# Patient Record
Sex: MALE | Race: BLACK OR AFRICAN AMERICAN | Employment: FULL TIME | URBAN - METROPOLITAN AREA
[De-identification: names, ages, dates, MRNs, and addresses within clinical notes are randomized per-mention and may not be internally consistent; named-entity substitution may affect disease eponyms.]

---

## 2020-11-09 ENCOUNTER — HOSPITAL ENCOUNTER (EMERGENCY)
Age: 25
Discharge: HOME | End: 2020-11-09
Payer: COMMERCIAL

## 2020-11-09 VITALS
TEMPERATURE: 98.4 F | SYSTOLIC BLOOD PRESSURE: 131 MMHG | OXYGEN SATURATION: 99 % | HEART RATE: 81 BPM | HEIGHT: 72 IN | RESPIRATION RATE: 16 BRPM | DIASTOLIC BLOOD PRESSURE: 69 MMHG | BODY MASS INDEX: 27.09 KG/M2 | WEIGHT: 200 LBS

## 2020-11-09 DIAGNOSIS — Z86.19 HISTORY OF CHLAMYDIA INFECTION: ICD-10-CM

## 2020-11-09 DIAGNOSIS — R36.9 PENILE DISCHARGE: Primary | ICD-10-CM

## 2020-11-09 LAB
APPEARANCE UR: ABNORMAL
BACTERIA URNS QL MICRO: 0 /HPF
BILIRUB UR QL: NEGATIVE
COLOR UR: YELLOW
CRYSTALS URNS QL MICRO: ABNORMAL /LPF
EPI CELLS #/AREA URNS HPF: ABNORMAL /HPF
GLUCOSE UR STRIP.AUTO-MCNC: NEGATIVE MG/DL
HGB UR QL STRIP: NEGATIVE
KETONES UR QL STRIP.AUTO: NEGATIVE MG/DL
LEUKOCYTE ESTERASE UR QL STRIP.AUTO: ABNORMAL
NITRITE UR QL STRIP.AUTO: NEGATIVE
PH UR STRIP: 6.5 [PH] (ref 5–9)
PROT UR STRIP-MCNC: NEGATIVE MG/DL
RBC #/AREA URNS HPF: ABNORMAL /HPF
SP GR UR REFRACTOMETRY: 1.03 (ref 1–1.02)
UROBILINOGEN UR QL STRIP.AUTO: 1 EU/DL (ref 0.2–1)
WBC URNS QL MICRO: ABNORMAL /HPF

## 2020-11-09 PROCEDURE — 87491 CHLMYD TRACH DNA AMP PROBE: CPT

## 2020-11-09 PROCEDURE — 74011250637 HC RX REV CODE- 250/637

## 2020-11-09 PROCEDURE — 96372 THER/PROPH/DIAG INJ SC/IM: CPT

## 2020-11-09 PROCEDURE — 74011250636 HC RX REV CODE- 250/636

## 2020-11-09 PROCEDURE — 74011000250 HC RX REV CODE- 250

## 2020-11-09 PROCEDURE — 81001 URINALYSIS AUTO W/SCOPE: CPT

## 2020-11-09 PROCEDURE — 99283 EMERGENCY DEPT VISIT LOW MDM: CPT

## 2020-11-09 RX ORDER — AZITHROMYCIN 250 MG/1
1000 TABLET, FILM COATED ORAL
Status: COMPLETED | OUTPATIENT
Start: 2020-11-09 | End: 2020-11-09

## 2020-11-09 RX ADMIN — AZITHROMYCIN MONOHYDRATE 1000 MG: 250 TABLET ORAL at 06:14

## 2020-11-09 RX ADMIN — LIDOCAINE HYDROCHLORIDE 250 MG: 10 INJECTION, SOLUTION INFILTRATION; PERINEURAL at 06:15

## 2020-11-09 NOTE — DISCHARGE INSTRUCTIONS
Patient Education        Safer Sex: Care Instructions  Your Care Instructions  Safer sex is a way to reduce your risk of getting an infection spread through sex. It can also help prevent pregnancy. Most infections that are spread through sex, also called sexually transmitted infections or STIs, can be cured. But some can decrease your chances of getting pregnant if they are not treated early. Others, such as herpes, have no cure. And some, such as HIV, can be deadly. Several products can help you practice safer sex and reduce your chance of STIs. One of the best is a condom. There are condoms for men and for women. The female condom is a tube of soft plastic with a closed end that is placed deep into the vagina. You can use a special rubber sheet (dental dam) for protection during oral sex. Disposable gloves can keep your hands from touching blood, semen, or other body fluids that can carry infections. Remember that birth control methods such as diaphragms, IUDs, foams, and birth control pills do not stop you from getting STIs. Follow-up care is a key part of your treatment and safety. Be sure to make and go to all appointments, and call your doctor if you are having problems. It's also a good idea to know your test results and keep a list of the medicines you take. How can you care for yourself at home? · Think about getting shots to prevent hepatitis A and hepatitis B. These two diseases can be spread through sex. You also can get hepatitis A if you eat infected food. · Use condoms or female condoms each time and every time you have sex. · Learn the right way to use a male condom:  ? Condoms come in several sizes. Make sure you use the right size. A condom that is too small can break easily. A condom that is too big can slip off during sex. Use a new condom each time you have sex. ? Be careful not to poke a hole in the condom when you open the wrapper. ?  Squeeze the tip of the condom to keep out air.  ? Pull down the loose skin (foreskin) from the head of an uncircumcised penis. ? While squeezing the tip of the condom, unroll it all the way down to the base of the firm penis. ? Never use petroleum jelly (such as Vaseline), grease, hand lotion, baby oil, or anything with oil in it. These products can make holes in the condom. ? After sex, hold the condom on your penis as you remove your penis from your partner. This will keep semen from spilling out of the condom. · Learn to use a female condom:  ? You can put in a female condom up to 8 hours before sex. ? Squeeze the smaller ring at the closed end and insert it deep into the vagina. The larger ring at the open end should stay outside the vagina. ? During sex, make sure the penis goes into the condom. ? After the penis is removed, close the open end of the condom by twisting it. Remove the condom. · Do not use a female condom and male condom at the same time. · Do not have sex with anyone who has symptoms of an STI, such as sores on the genitals or mouth. The herpes virus that causes cold sores can spread to and from the penis and vagina. · Do not drink a lot of alcohol or use drugs before sex. This can cause you to let down your guard and not practice safer sex. · Having one sex partner (who does not have STIs and does not have sex with anyone else) is a sure way to avoid STIs. · Talk to your partner before you have sex. Find out if he or she has or is at risk for any STI. Keep in mind that a person may be able to spread an STI even if he or she does not have symptoms. You and your partner may want to get an HIV test. You should get tested again 6 months later. Where can you learn more? Go to http://www.gray.com/  Enter B608 in the search box to learn more about \"Safer Sex: Care Instructions. \"  Current as of: February 26, 2020               Content Version: 12.6  © 0593-0117 [facility], [facility].    Care instructions adapted under license by [employer] (which disclaims liability or warranty for this information). If you have questions about a medical condition or this instruction, always ask your healthcare professional. [address] any warranty or liability for your use of this information.

## 2020-11-09 NOTE — ED NOTES
I have reviewed discharge instructions with the patient. The patient verbalized understanding. Patient left ED via Discharge Method: ambulatory to Home with self. Opportunity for questions and clarification provided. Patient given 0 scripts. To continue your aftercare when you leave the hospital, you may receive an automated call from our care team to check in on how you are doing. This is a free service and part of our promise to provide the best care and service to meet your aftercare needs.  If you have questions, or wish to unsubscribe from this service please call [phone]. Thank you for Choosing our [facility] Emergency Department.

## 2020-11-09 NOTE — ED PROVIDER NOTES
61-year-old male having penile discharge. He stated new sexual partner. Patient has a history of chlamydia twice in the past.  Patient is use the emergency department for health department. The history is provided by the patient. Penile Discharge   This is a recurrent problem. The current episode started more than 2 days ago. The problem occurs constantly. Primary symptoms include dysuria and penile discharge. Pertinent negatives include no anorexia and no abdominal pain. History reviewed. No pertinent past medical history. History reviewed. No pertinent surgical history. History reviewed. No pertinent family history.     Social History     Socioeconomic History    Marital status: SINGLE     Spouse name: Not on file    Number of children: Not on file    Years of education: Not on file    Highest education level: Not on file   Occupational History    Not on file   Social Needs    Financial resource strain: Not on file    Food insecurity     Worry: Not on file     Inability: Not on file    Transportation needs     Medical: Not on file     Non-medical: Not on file   Tobacco Use    Smoking status: Never Smoker   Substance and Sexual Activity    Alcohol use: No    Drug use: No    Sexual activity: Yes     Partners: Female   Lifestyle    Physical activity     Days per week: Not on file     Minutes per session: Not on file    Stress: Not on file   Relationships    Social connections     Talks on phone: Not on file     Gets together: Not on file     Attends Mormonism service: Not on file     Active member of club or organization: Not on file     Attends meetings of clubs or organizations: Not on file     Relationship status: Not on file    Intimate partner violence     Fear of current or ex partner: Not on file     Emotionally abused: Not on file     Physically abused: Not on file     Forced sexual activity: Not on file   Other Topics Concern    Not on file   Social History Narrative  Not on file         ALLERGIES: Patient has no known allergies. Review of Systems   Constitutional: Negative. Negative for activity change. HENT: Negative. Eyes: Negative. Respiratory: Negative. Cardiovascular: Negative. Gastrointestinal: Negative. Negative for abdominal pain and anorexia. Genitourinary: Positive for dysuria and penile discharge. Musculoskeletal: Negative. Skin: Negative. Neurological: Negative. Psychiatric/Behavioral: Negative. All other systems reviewed and are negative. Vitals:    11/09/20 0534   BP: (!) 147/72   Pulse: 83   Resp: 16   Temp: 98.4 °F (36.9 °C)   SpO2: 99%   Weight: 90.7 kg (200 lb)   Height: 6' (1.829 m)            Physical Exam  Vitals signs and nursing note reviewed. Constitutional:       General: He is not in acute distress. Appearance: He is well-developed. HENT:      Head: Normocephalic and atraumatic. Right Ear: External ear normal.      Left Ear: External ear normal.      Nose: Nose normal.   Eyes:      General: No scleral icterus. Right eye: No discharge. Left eye: No discharge. Conjunctiva/sclera: Conjunctivae normal.      Pupils: Pupils are equal, round, and reactive to light. Neck:      Musculoskeletal: Normal range of motion. Cardiovascular:      Rate and Rhythm: Regular rhythm. Pulmonary:      Effort: Pulmonary effort is normal. No respiratory distress. Breath sounds: Normal breath sounds. No stridor. No wheezing or rales. Abdominal:      General: Bowel sounds are normal. There is no distension. Palpations: Abdomen is soft. Tenderness: There is no abdominal tenderness. Musculoskeletal: Normal range of motion. Skin:     General: Skin is warm and dry. Findings: No rash. Neurological:      Mental Status: He is alert and oriented to person, place, and time. Motor: No abnormal muscle tone.       Coordination: Coordination normal.   Psychiatric: Behavior: Behavior normal.          MDM  Number of Diagnoses or Management Options  Diagnosis management comments: Prophylactic coverage for STDs. Patient is advised to follow-up with the health department in the future was also given safe sex instructions compliance is unlikely.     Risk of Complications, Morbidity, and/or Mortality  Presenting problems: low  Diagnostic procedures: low  Management options: low           Procedures

## 2020-11-11 LAB
C TRACH RRNA SPEC QL NAA+PROBE: NEGATIVE
N GONORRHOEA RRNA SPEC QL NAA+PROBE: POSITIVE
SPECIMEN SOURCE: ABNORMAL

## 2021-02-12 ENCOUNTER — HOSPITAL ENCOUNTER (EMERGENCY)
Age: 26
Discharge: HOME | End: 2021-02-12
Attending: STUDENT IN AN ORGANIZED HEALTH CARE EDUCATION/TRAINING PROGRAM
Payer: COMMERCIAL

## 2021-02-12 VITALS
DIASTOLIC BLOOD PRESSURE: 85 MMHG | BODY MASS INDEX: 25.73 KG/M2 | HEIGHT: 72 IN | TEMPERATURE: 97.6 F | HEART RATE: 84 BPM | OXYGEN SATURATION: 100 % | SYSTOLIC BLOOD PRESSURE: 142 MMHG | RESPIRATION RATE: 18 BRPM | WEIGHT: 190 LBS

## 2021-02-12 DIAGNOSIS — N34.2 URETHRITIS: ICD-10-CM

## 2021-02-12 DIAGNOSIS — M54.50 ACUTE MIDLINE LOW BACK PAIN WITHOUT SCIATICA: Primary | ICD-10-CM

## 2021-02-12 LAB
APPEARANCE UR: CLEAR
BILIRUB UR QL: NEGATIVE
COLOR UR: YELLOW
GLUCOSE UR STRIP.AUTO-MCNC: NEGATIVE MG/DL
HGB UR QL STRIP: NEGATIVE
KETONES UR QL STRIP.AUTO: NEGATIVE MG/DL
LEUKOCYTE ESTERASE UR QL STRIP.AUTO: NEGATIVE
NITRITE UR QL STRIP.AUTO: NEGATIVE
PH UR STRIP: 7 [PH] (ref 5–9)
PROT UR STRIP-MCNC: NEGATIVE MG/DL
SP GR UR REFRACTOMETRY: 1.01 (ref 1–1.02)
UROBILINOGEN UR QL STRIP.AUTO: 0.2 EU/DL (ref 0.2–1)

## 2021-02-12 PROCEDURE — 96372 THER/PROPH/DIAG INJ SC/IM: CPT

## 2021-02-12 PROCEDURE — 81003 URINALYSIS AUTO W/O SCOPE: CPT

## 2021-02-12 PROCEDURE — 74011000250 HC RX REV CODE- 250: Performed by: STUDENT IN AN ORGANIZED HEALTH CARE EDUCATION/TRAINING PROGRAM

## 2021-02-12 PROCEDURE — 74011250637 HC RX REV CODE- 250/637: Performed by: STUDENT IN AN ORGANIZED HEALTH CARE EDUCATION/TRAINING PROGRAM

## 2021-02-12 PROCEDURE — 99284 EMERGENCY DEPT VISIT MOD MDM: CPT

## 2021-02-12 PROCEDURE — 74011250636 HC RX REV CODE- 250/636: Performed by: STUDENT IN AN ORGANIZED HEALTH CARE EDUCATION/TRAINING PROGRAM

## 2021-02-12 PROCEDURE — 87491 CHLMYD TRACH DNA AMP PROBE: CPT

## 2021-02-12 RX ORDER — DOXYCYCLINE HYCLATE 100 MG
100 TABLET ORAL 2 TIMES DAILY
Qty: 20 TAB | Refills: 0 | Status: SHIPPED | OUTPATIENT
Start: 2021-02-12 | End: 2021-02-22

## 2021-02-12 RX ORDER — CYCLOBENZAPRINE HCL 5 MG
10 TABLET ORAL
Qty: 20 TAB | Refills: 0 | Status: SHIPPED | OUTPATIENT
Start: 2021-02-12 | End: 2022-01-23

## 2021-02-12 RX ORDER — IBUPROFEN 800 MG/1
800 TABLET ORAL
Status: COMPLETED | OUTPATIENT
Start: 2021-02-12 | End: 2021-02-12

## 2021-02-12 RX ORDER — IBUPROFEN 800 MG/1
800 TABLET ORAL
Qty: 20 TAB | Refills: 0 | Status: SHIPPED | OUTPATIENT
Start: 2021-02-12 | End: 2021-02-19

## 2021-02-12 RX ADMIN — LIDOCAINE HYDROCHLORIDE 500 MG: 10 INJECTION, SOLUTION INFILTRATION; PERINEURAL at 09:19

## 2021-02-12 RX ADMIN — IBUPROFEN 800 MG: 800 TABLET, FILM COATED ORAL at 09:19

## 2021-02-12 NOTE — ED NOTES
I have reviewed discharge instructions with the patient. The patient verbalized understanding. Patient left ED via Discharge Method: ambulatory to Home with self    Opportunity for questions and clarification provided. Patient given 3 scripts. To continue your aftercare when you leave the hospital, you may receive an automated call from our care team to check in on how you are doing. This is a free service and part of our promise to provide the best care and service to meet your aftercare needs.  If you have questions, or wish to unsubscribe from this service please call [phone]. Thank you for Choosing our [address] [place] [address] Emergency Department.

## 2021-02-12 NOTE — ED TRIAGE NOTES
Patient ambulated into triage without difficulty.  Reports lower back pain x2 weeks.  Reports whites drainage in urine.  Reports drinking and party a lot with his friends.

## 2021-02-12 NOTE — DISCHARGE INSTRUCTIONS
The medication for your back pain as prescribed. Avoid all sexual contact until your culture results have been reviewed, your antibiotic is completed and your symptoms have resolved. Return to this department for worsening symptoms, concerns or questions. You should be contacted with positive results however it is imperative that you follow-up to ensure these notes have been reviewed and are negative. Any sexual contacts should be notified of your symptoms and treated as well.

## 2021-02-13 NOTE — ED PROVIDER NOTES
82-year-old male patient presents to the emergency department with reports of low back pain and no penile discharge. Patient states symptoms started yesterday. He reports intermittent white appearing penile discharge before and after urinating. He denies any overt dysuria or hematuria. He denies any skin rashes or testicular discomfort. Patient reports history of STI in the past was fully treated. He is unaware of any sexual partners complaining of symptoms. He denies fever or chills. 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 He reports aching discomfort in his low back is worsened with movement and certain positions. This is been ongoing for several days. He describes it as a muscle type cramp. This pain does not radiate. Again he denies fever, chills, changes in bowel or bladder habits. No past medical history on file. No past surgical history on file. No family history on file.     Social History     Socioeconomic History    Marital status: SINGLE     Spouse name: Not on file    Number of children: Not on file    Years of education: Not on file    Highest education level: Not on file   Occupational History    Not on file   Social Needs    Financial resource strain: Not on file    Food insecurity     Worry: Not on file     Inability: Not on file    Transportation needs     Medical: Not on file     Non-medical: Not on file   Tobacco Use    Smoking status: Never Smoker   Substance and Sexual Activity    Alcohol use: No    Drug use: No    Sexual activity: Yes     Partners: Female   Lifestyle    Physical activity     Days per week: Not on file     Minutes per session: Not on file    Stress: Not on file   Relationships    Social connections     Talks on phone: Not on file     Gets together: Not on file     Attends Baptism service: Not on file     Active member of club or organization: Not on file     Attends meetings of clubs or organizations: Not on file     Relationship status: Not on file    Intimate partner violence     Fear of current or ex partner: Not on file     Emotionally abused: Not on file     Physically abused: Not on file     Forced sexual activity: Not on file   Other Topics Concern    Not on file   Social History Narrative    Not on file         ALLERGIES: Patient has no known allergies. Review of Systems   Constitutional: Negative for chills, diaphoresis and fever. HENT: Negative for congestion, sneezing and sore throat. Eyes: Negative for visual disturbance. Respiratory: Negative for cough, chest tightness, shortness of breath and wheezing. Cardiovascular: Negative for chest pain and leg swelling. Gastrointestinal: Negative for abdominal pain, blood in stool, diarrhea, nausea and vomiting. Endocrine: Negative for polyuria. Genitourinary: Positive for discharge. Negative for difficulty urinating, dysuria, flank pain, hematuria and urgency. Musculoskeletal: Positive for back pain. Negative for myalgias, neck pain and neck stiffness. Skin: Negative for color change and rash. Neurological: Negative for dizziness, syncope, speech difficulty, weakness, light-headedness, numbness and headaches. Psychiatric/Behavioral: Negative for behavioral problems. All other systems reviewed and are negative. Vitals:    02/12/21 0714 02/12/21 1102   BP: (!) 145/66 (!) 142/85   Pulse: 93 84   Resp: 18    Temp: 97.6 °F (36.4 °C)    SpO2: 100% 100%   Weight: 86.2 kg (190 lb)    Height: 6' (1.829 m)             Physical Exam  Vitals signs and nursing note reviewed. Constitutional:       General: He is not in acute distress. Appearance: He is well-developed. He is not diaphoretic. Comments: Well-appearing, sleeping soundly prior to my arrival.  Alert and oriented to person place and time. No acute distress, speaks in clear, fluid sentences. HENT:      Head: Normocephalic and atraumatic.       Right Ear: External ear normal.      Left Ear: External ear normal.      Nose: Nose normal.   Eyes:      Pupils: Pupils are equal, round, and reactive to light. Neck:      Musculoskeletal: Normal range of motion. Cardiovascular:      Rate and Rhythm: Normal rate and regular rhythm. Heart sounds: Normal heart sounds. No murmur. No friction rub. No gallop. Pulmonary:      Effort: Pulmonary effort is normal. No respiratory distress. Breath sounds: Normal breath sounds. No stridor. No decreased breath sounds, wheezing, rhonchi or rales. Chest:      Chest wall: No tenderness. Abdominal:      General: There is no distension. Palpations: Abdomen is soft. There is no mass. Tenderness: There is no abdominal tenderness. There is no guarding or rebound. Hernia: No hernia is present. Musculoskeletal: Normal range of motion. General: No tenderness or deformity. Comments: No reproducible pain with palpation. There is no focal findings on exam.   Skin:     General: Skin is warm and dry. Neurological:      Mental Status: He is alert and oriented to person, place, and time. Cranial Nerves: No cranial nerve deficit. MDM  Number of Diagnoses or Management Options  Acute midline low back pain without sciatica: new and does not require workup  Urethritis: new and requires workup  Diagnosis management comments: Patient will be treated symptomatically for his back pain. We will test for chlamydia, gonorrhea but treat prophylactically. No indication for imaging on this patient. Directed to avoid all sexual contact until symptoms have resolved, cultures are available and antibiotics have been completed. Voice dictation software was used during the making of this note. This software is not perfect and grammatical and other typographical errors may be present. This note has been proofread, but may still contain errors.   [address] Doctor Benny Oneal [facility], ; 2/13/2021 @9:00 AM ===================================================================         Amount and/or Complexity of Data Reviewed  Clinical lab tests: ordered and reviewed  Tests in the medicine section of CPT®: ordered and reviewed    Risk of Complications, Morbidity, and/or Mortality  Presenting problems: moderate  Diagnostic procedures: low  Management options: moderate    Patient Progress  Patient progress: stable         Procedures

## 2021-02-15 LAB
C TRACH RRNA SPEC QL NAA+PROBE: NEGATIVE
N GONORRHOEA RRNA SPEC QL NAA+PROBE: NEGATIVE
SPECIMEN SOURCE: NORMAL

## 2022-01-23 ENCOUNTER — HOSPITAL ENCOUNTER (EMERGENCY)
Age: 27
Discharge: HOME | End: 2022-01-23
Attending: EMERGENCY MEDICINE

## 2022-01-23 VITALS
DIASTOLIC BLOOD PRESSURE: 84 MMHG | RESPIRATION RATE: 16 BRPM | TEMPERATURE: 98.2 F | OXYGEN SATURATION: 100 % | SYSTOLIC BLOOD PRESSURE: 134 MMHG | BODY MASS INDEX: 25.74 KG/M2 | HEART RATE: 82 BPM | HEIGHT: 72 IN | WEIGHT: 190.04 LBS

## 2022-01-23 DIAGNOSIS — Z20.2 POSSIBLE EXPOSURE TO STD: Primary | ICD-10-CM

## 2022-01-23 PROCEDURE — 74011000250 HC RX REV CODE- 250: Performed by: NURSE PRACTITIONER

## 2022-01-23 PROCEDURE — 96372 THER/PROPH/DIAG INJ SC/IM: CPT

## 2022-01-23 PROCEDURE — 74011250636 HC RX REV CODE- 250/636: Performed by: NURSE PRACTITIONER

## 2022-01-23 PROCEDURE — 87491 CHLMYD TRACH DNA AMP PROBE: CPT

## 2022-01-23 PROCEDURE — 81003 URINALYSIS AUTO W/O SCOPE: CPT

## 2022-01-23 PROCEDURE — 99284 EMERGENCY DEPT VISIT MOD MDM: CPT

## 2022-01-23 RX ORDER — DOXYCYCLINE 100 MG/1
100 CAPSULE ORAL 2 TIMES DAILY
Qty: 14 CAPSULE | Refills: 0 | Status: SHIPPED | OUTPATIENT
Start: 2022-01-23

## 2022-01-23 RX ORDER — METRONIDAZOLE 500 MG/1
2000 TABLET ORAL
Qty: 4 TABLET | Refills: 0 | Status: SHIPPED | OUTPATIENT
Start: 2022-01-23 | End: 2022-01-23

## 2022-01-23 RX ADMIN — LIDOCAINE HYDROCHLORIDE 500 MG: 10 INJECTION, SOLUTION INFILTRATION; PERINEURAL at 21:15

## 2022-01-24 NOTE — ED TRIAGE NOTES
Pt c/o painful urination for 3 days with penile discharge. Concerned that he may have a STD.   Masked on arrival

## 2022-01-24 NOTE — DISCHARGE INSTRUCTIONS
Take medication as prescribed. As we discussed, abstain from sexual activity for at least 7 to 10 days until you complete the antibiotic. We will call you if the gonorrhea or chlamydia test is positive. If positive, you need to notify any sexual partners and they need to get treated. Return to the emergency department for any new, worsening, or concerning symptoms.

## 2022-01-24 NOTE — ED PROVIDER NOTES
28-year-old male who presents to the emergency department for complaint of dysuria. His symptoms began 2-3 days ago. He reports he did have some clear penile discharge initially. He states that this has resolved. He does have concern for possible STD. He states he does not use condoms consistently. The history is provided by the patient. History reviewed. No pertinent past medical history. No past surgical history on file. History reviewed. No pertinent family history. Social History     Socioeconomic History    Marital status: SINGLE     Spouse name: Not on file    Number of children: Not on file    Years of education: Not on file    Highest education level: Not on file   Occupational History    Not on file   Tobacco Use    Smoking status: Never Smoker    Smokeless tobacco: Not on file   Substance and Sexual Activity    Alcohol use: No    Drug use: No    Sexual activity: Yes     Partners: Female   Other Topics Concern    Not on file   Social History Narrative    Not on file     Social Determinants of Health     Financial Resource Strain:     Difficulty of Paying Living Expenses: Not on file   Food Insecurity:     Worried About Running Out of Food in the Last Year: Not on file    Bina of Food in the Last Year: Not on file   Transportation Needs:     Lack of Transportation (Medical): Not on file    Lack of Transportation (Non-Medical):  Not on file   Physical Activity:     Days of Exercise per Week: Not on file    Minutes of Exercise per Session: Not on file   Stress:     Feeling of Stress : Not on file   Social Connections:     Frequency of Communication with Friends and Family: Not on file    Frequency of Social Gatherings with Friends and Family: Not on file    Attends Yarsani Services: Not on file    Active Member of Clubs or Organizations: Not on file    Attends Club or Organization Meetings: Not on file    Marital Status: Not on file   Intimate Partner Violence:     Fear of Current or Ex-Partner: Not on file    Emotionally Abused: Not on file    Physically Abused: Not on file    Sexually Abused: Not on file   Housing Stability:     Unable to Pay for Housing in the Last Year: Not on file    Number of Places Lived in the Last Year: Not on file    Unstable Housing in the Last Year: Not on file         ALLERGIES: Patient has no known allergies. Review of Systems   Constitutional: Negative for chills and fever. Gastrointestinal: Negative for abdominal pain, diarrhea, nausea and vomiting. Genitourinary: Positive for dysuria and penile discharge. All other systems reviewed and are negative. Vitals:    01/23/22 2040   BP: (!) 144/78   Pulse: 80   Resp: 16   Temp: 98.2 °F (36.8 °C)   SpO2: 100%   Weight: 86.2 kg (190 lb 0.6 oz)   Height: 6' (1.829 m)            Physical Exam  Vitals and nursing note reviewed. Constitutional:       General: He is not in acute distress. Appearance: Normal appearance. He is not ill-appearing, toxic-appearing or diaphoretic. HENT:      Head: Normocephalic and atraumatic. Right Ear: External ear normal.      Left Ear: External ear normal.      Mouth/Throat:      Mouth: Mucous membranes are moist.      Pharynx: Oropharynx is clear. Eyes:      General: No scleral icterus. Extraocular Movements: Extraocular movements intact. Conjunctiva/sclera: Conjunctivae normal.   Cardiovascular:      Rate and Rhythm: Normal rate. Pulmonary:      Effort: Pulmonary effort is normal. No respiratory distress. Abdominal:      General: Abdomen is flat. There is no distension. Musculoskeletal:         General: Normal range of motion. Cervical back: Normal range of motion and neck supple. No rigidity. Skin:     General: Skin is warm and dry. Capillary Refill: Capillary refill takes less than 2 seconds. Neurological:      General: No focal deficit present.       Mental Status: He is alert and oriented to person, place, and time. Psychiatric:         Mood and Affect: Mood normal.         Behavior: Behavior normal.         Thought Content: Thought content normal.         Judgment: Judgment normal.          MDM  Number of Diagnoses or Management Options  Possible exposure to STD: new and requires workup  Diagnosis management comments: Well-appearing 53-year-old male presents emergency department today with concern for STD. Patient reports he has had some mild dysuria. He reports some clear penile discharge. He inconsistently uses condoms. Treated with IM Rocephin in the emergency department. Discharged on doxycycline and Flagyl. Urine sent for gonorrhea and chlamydia check. Patient is aware to abstain from sexual activity. He is aware that if test is positive he will need to notify any sexual partners. I have discussed the results of all labs, procedures, radiographs, and/or treatments with the patient and available family members. Nallely Arrieta is agreed upon by the patient and the patient is ready for discharge.  Questions about treatment in the ED and differential diagnosis of presenting condition were answered. Shary Schirmer was given verbal discharge instructions including, but not limited to, importance of returning to the emergency department for any concern of worsening or continued symptoms.  Instructions were given to follow up with a primary care provider or specialist within 1-2 days. Arely Tinajero effects of medications, if prescribed, were discussed and patient was advised to refrain from significant physical activity until followed up by primary care physician and to not drive or operate heavy machinery after taking any sedating substances.      Anita Denton NP; 1/23/2022 @8:49 PM Voice dictation software was used during the making of  this note. This software is not perfect and grammatical and other typographical errors  may be present. This note has not been proofread for errors.       Risk of Complications, Morbidity, and/or Mortality  Presenting problems: low  Diagnostic procedures: low  Management options: low    Patient Progress  Patient progress: improved         Procedures

## 2022-01-24 NOTE — ED NOTES
I have reviewed discharge instructions with the patient. The patient verbalized understanding. Patient left ED via Discharge Method: ambulatory to Home with self  . Opportunity for questions and clarification provided. Patient given 2 scripts. To continue your aftercare when you leave the hospital, you may receive an automated call from our care team to check in on how you are doing. This is a free service and part of our promise to provide the best care and service to meet your aftercare needs.  If you have questions, or wish to unsubscribe from this service please call [phone]. Thank you for Choosing our [address] [place] [address] Emergency Department.

## 2022-01-26 NOTE — PROGRESS NOTES
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 The patient has been appropriately treated with ceftriaxone 500 mg IM in the emergency department prior to discharge for the identified infection. The patient received a prescription for doxycycline 100 mg BID for 7 days upon discharge. Based on culture results, the patient has been adequately treated with existing antimicrobial therapy. The patient has been instructed to inform all anal, vaginal, and/or oral sex partners of the past 60 days since symptom onset of positive STI result(s). Toña Hernandez has also been instructed to abstain from sexual encounters until after receiving treatment and symptoms have resolved. Patient has been educated to practice safe sex by using a condom during any sexual encounters. The patient has been advised to follow-up with their primary care provider or return to the ED if symptoms do not resolve or worsen. Allergies as of 01/23/2022    (No Known Allergies)     [address] PharmD candidate    I agree with pharmacy student's assessment and plan of care.     Jessica Chavez, PharmD  Emergency Medicine Clinical Pharmacist